# Patient Record
Sex: MALE | Race: BLACK OR AFRICAN AMERICAN | NOT HISPANIC OR LATINO | URBAN - METROPOLITAN AREA
[De-identification: names, ages, dates, MRNs, and addresses within clinical notes are randomized per-mention and may not be internally consistent; named-entity substitution may affect disease eponyms.]

---

## 2023-11-29 VITALS
HEIGHT: 70 IN | HEART RATE: 87 BPM | SYSTOLIC BLOOD PRESSURE: 129 MMHG | OXYGEN SATURATION: 98 % | RESPIRATION RATE: 16 BRPM | TEMPERATURE: 97 F | DIASTOLIC BLOOD PRESSURE: 76 MMHG | WEIGHT: 298.51 LBS

## 2023-11-29 NOTE — PRE-OP CHECKLIST - IDENTIFICATION BAND VERIFIED
Per fax received from [email or web address] - Cyclobenzaprine (FLEXERIL) 10 MG tablet is not covered by patient's Insurance Company  ADALBERTO Zamorano - Please choose:  1.  Change medication that is not covered to a different medication and send new prescription to patient's pharmacy?  2.  Patient will need to pay for the non-covered medication out-of-pocket?   3.  Try for Prior Authorization with Insurance Company to get medication covered?     Key# [id number]   done

## 2023-11-30 ENCOUNTER — INPATIENT (INPATIENT)
Facility: HOSPITAL | Age: 38
LOS: 0 days | Discharge: HOME | DRG: 621 | End: 2023-12-01
Attending: SURGERY | Admitting: SURGERY
Payer: COMMERCIAL

## 2023-11-30 LAB
BLD GP AB SCN SERPL QL: NEGATIVE — SIGNIFICANT CHANGE UP
BLD GP AB SCN SERPL QL: NEGATIVE — SIGNIFICANT CHANGE UP
HCT VFR BLD CALC: 43.7 % — SIGNIFICANT CHANGE UP (ref 39–50)
HCT VFR BLD CALC: 43.7 % — SIGNIFICANT CHANGE UP (ref 39–50)
HGB BLD-MCNC: 14 G/DL — SIGNIFICANT CHANGE UP (ref 13–17)
HGB BLD-MCNC: 14 G/DL — SIGNIFICANT CHANGE UP (ref 13–17)
MCHC RBC-ENTMCNC: 27.2 PG — SIGNIFICANT CHANGE UP (ref 27–34)
MCHC RBC-ENTMCNC: 27.2 PG — SIGNIFICANT CHANGE UP (ref 27–34)
MCHC RBC-ENTMCNC: 32 GM/DL — SIGNIFICANT CHANGE UP (ref 32–36)
MCHC RBC-ENTMCNC: 32 GM/DL — SIGNIFICANT CHANGE UP (ref 32–36)
MCV RBC AUTO: 84.9 FL — SIGNIFICANT CHANGE UP (ref 80–100)
MCV RBC AUTO: 84.9 FL — SIGNIFICANT CHANGE UP (ref 80–100)
NRBC # BLD: 0 /100 WBCS — SIGNIFICANT CHANGE UP (ref 0–0)
NRBC # BLD: 0 /100 WBCS — SIGNIFICANT CHANGE UP (ref 0–0)
PLATELET # BLD AUTO: 192 K/UL — SIGNIFICANT CHANGE UP (ref 150–400)
PLATELET # BLD AUTO: 192 K/UL — SIGNIFICANT CHANGE UP (ref 150–400)
RBC # BLD: 5.15 M/UL — SIGNIFICANT CHANGE UP (ref 4.2–5.8)
RBC # BLD: 5.15 M/UL — SIGNIFICANT CHANGE UP (ref 4.2–5.8)
RBC # FLD: 14.3 % — SIGNIFICANT CHANGE UP (ref 10.3–14.5)
RBC # FLD: 14.3 % — SIGNIFICANT CHANGE UP (ref 10.3–14.5)
RH IG SCN BLD-IMP: POSITIVE — SIGNIFICANT CHANGE UP
RH IG SCN BLD-IMP: POSITIVE — SIGNIFICANT CHANGE UP
WBC # BLD: 10.76 K/UL — HIGH (ref 3.8–10.5)
WBC # BLD: 10.76 K/UL — HIGH (ref 3.8–10.5)
WBC # FLD AUTO: 10.76 K/UL — HIGH (ref 3.8–10.5)
WBC # FLD AUTO: 10.76 K/UL — HIGH (ref 3.8–10.5)

## 2023-11-30 PROCEDURE — 88307 TISSUE EXAM BY PATHOLOGIST: CPT | Mod: 26

## 2023-11-30 DEVICE — STAPLER COVIDIEN TRI-STAPLE 60MM BLACK RELOAD: Type: IMPLANTABLE DEVICE | Site: ABDOMINAL | Status: FUNCTIONAL

## 2023-11-30 DEVICE — CLIP APPLIER ETHICON LIGAMAX 5MM: Type: IMPLANTABLE DEVICE | Site: ABDOMINAL | Status: FUNCTIONAL

## 2023-11-30 DEVICE — STAPLE SEAMGUARD 60 UNI STRT ROTIC GRN: Type: IMPLANTABLE DEVICE | Site: ABDOMINAL | Status: FUNCTIONAL

## 2023-11-30 DEVICE — TISSEEL 4ML: Type: IMPLANTABLE DEVICE | Site: ABDOMINAL | Status: FUNCTIONAL

## 2023-11-30 DEVICE — SURGICEL 4 X 8": Type: IMPLANTABLE DEVICE | Site: ABDOMINAL | Status: FUNCTIONAL

## 2023-11-30 RX ORDER — LABETALOL HCL 100 MG
10 TABLET ORAL EVERY 6 HOURS
Refills: 0 | Status: DISCONTINUED | OUTPATIENT
Start: 2023-11-30 | End: 2023-12-01

## 2023-11-30 RX ORDER — HYDROMORPHONE HYDROCHLORIDE 2 MG/ML
0.5 INJECTION INTRAMUSCULAR; INTRAVENOUS; SUBCUTANEOUS ONCE
Refills: 0 | Status: DISCONTINUED | OUTPATIENT
Start: 2023-11-30 | End: 2023-11-30

## 2023-11-30 RX ORDER — THIAMINE MONONITRATE (VIT B1) 100 MG
500 TABLET ORAL DAILY
Refills: 0 | Status: DISCONTINUED | OUTPATIENT
Start: 2023-11-30 | End: 2023-12-01

## 2023-11-30 RX ORDER — ONDANSETRON 8 MG/1
4 TABLET, FILM COATED ORAL EVERY 6 HOURS
Refills: 0 | Status: DISCONTINUED | OUTPATIENT
Start: 2023-11-30 | End: 2023-12-01

## 2023-11-30 RX ORDER — ENOXAPARIN SODIUM 100 MG/ML
40 INJECTION SUBCUTANEOUS ONCE
Refills: 0 | Status: COMPLETED | OUTPATIENT
Start: 2023-11-30 | End: 2023-11-30

## 2023-11-30 RX ORDER — HYOSCYAMINE SULFATE 0.13 MG
0.12 TABLET ORAL EVERY 6 HOURS
Refills: 0 | Status: DISCONTINUED | OUTPATIENT
Start: 2023-11-30 | End: 2023-12-01

## 2023-11-30 RX ORDER — ACETAMINOPHEN 500 MG
1000 TABLET ORAL EVERY 6 HOURS
Refills: 0 | Status: DISCONTINUED | OUTPATIENT
Start: 2023-11-30 | End: 2023-12-01

## 2023-11-30 RX ORDER — ACETAMINOPHEN 500 MG
1000 TABLET ORAL ONCE
Refills: 0 | Status: COMPLETED | OUTPATIENT
Start: 2023-11-30 | End: 2023-11-30

## 2023-11-30 RX ORDER — INFLUENZA VIRUS VACCINE 15; 15; 15; 15 UG/.5ML; UG/.5ML; UG/.5ML; UG/.5ML
0.5 SUSPENSION INTRAMUSCULAR ONCE
Refills: 0 | Status: DISCONTINUED | OUTPATIENT
Start: 2023-11-30 | End: 2023-12-01

## 2023-11-30 RX ORDER — HYDROMORPHONE HYDROCHLORIDE 2 MG/ML
0.2 INJECTION INTRAMUSCULAR; INTRAVENOUS; SUBCUTANEOUS
Refills: 0 | Status: DISCONTINUED | OUTPATIENT
Start: 2023-11-30 | End: 2023-11-30

## 2023-11-30 RX ORDER — PANTOPRAZOLE SODIUM 20 MG/1
40 TABLET, DELAYED RELEASE ORAL DAILY
Refills: 0 | Status: DISCONTINUED | OUTPATIENT
Start: 2023-11-30 | End: 2023-12-01

## 2023-11-30 RX ORDER — SCOPALAMINE 1 MG/3D
1 PATCH, EXTENDED RELEASE TRANSDERMAL ONCE
Refills: 0 | Status: COMPLETED | OUTPATIENT
Start: 2023-11-30 | End: 2023-11-30

## 2023-11-30 RX ORDER — SODIUM CHLORIDE 9 MG/ML
1000 INJECTION, SOLUTION INTRAVENOUS
Refills: 0 | Status: DISCONTINUED | OUTPATIENT
Start: 2023-11-30 | End: 2023-12-01

## 2023-11-30 RX ADMIN — HYDROMORPHONE HYDROCHLORIDE 0.5 MILLIGRAM(S): 2 INJECTION INTRAMUSCULAR; INTRAVENOUS; SUBCUTANEOUS at 22:17

## 2023-11-30 RX ADMIN — SCOPALAMINE 1 PATCH: 1 PATCH, EXTENDED RELEASE TRANSDERMAL at 12:35

## 2023-11-30 RX ADMIN — Medication 400 MILLIGRAM(S): at 18:19

## 2023-11-30 RX ADMIN — Medication 0.12 MILLIGRAM(S): at 18:19

## 2023-11-30 RX ADMIN — HYDROMORPHONE HYDROCHLORIDE 0.5 MILLIGRAM(S): 2 INJECTION INTRAMUSCULAR; INTRAVENOUS; SUBCUTANEOUS at 23:17

## 2023-11-30 RX ADMIN — SCOPALAMINE 1 PATCH: 1 PATCH, EXTENDED RELEASE TRANSDERMAL at 18:31

## 2023-11-30 RX ADMIN — PANTOPRAZOLE SODIUM 40 MILLIGRAM(S): 20 TABLET, DELAYED RELEASE ORAL at 18:15

## 2023-11-30 RX ADMIN — ONDANSETRON 4 MILLIGRAM(S): 8 TABLET, FILM COATED ORAL at 21:37

## 2023-11-30 RX ADMIN — ENOXAPARIN SODIUM 40 MILLIGRAM(S): 100 INJECTION SUBCUTANEOUS at 12:36

## 2023-11-30 RX ADMIN — SODIUM CHLORIDE 150 MILLILITER(S): 9 INJECTION, SOLUTION INTRAVENOUS at 18:49

## 2023-11-30 RX ADMIN — Medication 10 MILLIGRAM(S): at 18:46

## 2023-11-30 RX ADMIN — Medication 1000 MILLIGRAM(S): at 12:35

## 2023-11-30 RX ADMIN — HYDROMORPHONE HYDROCHLORIDE 0.2 MILLIGRAM(S): 2 INJECTION INTRAMUSCULAR; INTRAVENOUS; SUBCUTANEOUS at 18:15

## 2023-11-30 NOTE — CONSULT NOTE ADULT - ASSESSMENT
38y old Male with PMH of Obesity and Hypertension who presented for a Laparoscopic sleeve gastrectomy and Laparoscopic repair of sliding hiatal hernia. For his hypertension, pre-operatively, his blood pressure was 150/80 and is currently 141/77. Patient states he is compliant with his Norvasc 5mg and his SBP usually ranges between 130-140's. Given that he has no other comorbidities, we would recommend keeping his SBP < 160, and should only give him extra anti-hypertensive medications if he is persistently hypertensive with SBP >160. Please continue with his home dose of Norvasc 5mg. For his throat pain, it is likely secondary to being intubated, but if he continues to have that sensation, we can attempt giving him lozenges or Ibuprofen. Surgical care as per primary team.    Thank you for this consultation and we will continue to monitor.

## 2023-11-30 NOTE — BRIEF OPERATIVE NOTE - NSICDXBRIEFPOSTOP_GEN_ALL_CORE_FT
POST-OP DIAGNOSIS:  Morbid obesity 30-Nov-2023 16:57:21  Mauricio Cormier  Hiatal hernia 30-Nov-2023 16:57:50  Mauricio Cormier

## 2023-11-30 NOTE — BRIEF OPERATIVE NOTE - NSICDXBRIEFPROCEDURE_GEN_ALL_CORE_FT
PROCEDURES:  Laparoscopic sleeve gastrectomy 30-Nov-2023 16:56:45  Mauricio Cormier  Laparoscopic repair of sliding hiatal hernia 30-Nov-2023 16:56:58  Mauricio Cormier

## 2023-11-30 NOTE — CONSULT NOTE ADULT - SUBJECTIVE AND OBJECTIVE BOX
Patient is a 38y old Male with PMH of Obesity and Hypertension who presented for a Laparoscopic sleeve gastrectomy and Laparoscopic repair of sliding hiatal hernia. Post-operatively, he complains of moderate diffuse abdominal pain, as expected. He does also feel like something is stuck in his throat after being intubated. When he tried to clear his throat, he had three episodes of NBNB vomiting. He denies any nausea.     Pre-operatively, his blood pressure was 150/80 and is currently 141/77. Patient states he is compliant with his Norvasc 5mg and his SBP usually ranges between 130-140's. He denies any headache, lightheadedness, chest pain or tightness. ROS as below.    PAST MEDICAL/SURGICAL HISTORY  PAST MEDICAL & SURGICAL HISTORY:  Obese  HTN (hypertension)    No significant past surgical history      MEDICATIONS  (STANDING):  hyoscyamine SL 0.125 milliGRAM(s) SubLingual every 6 hours  influenza   Vaccine 0.5 milliLiter(s) IntraMuscular once  lactated ringers. 1000 milliLiter(s) (150 mL/Hr) IV Continuous <Continuous>  pantoprazole  Injectable 40 milliGRAM(s) IV Push daily  thiamine IVPB 500 milliGRAM(s) IV Intermittent daily    MEDICATIONS  (PRN):  labetalol Injectable 10 milliGRAM(s) IV Push every 6 hours PRN Systolic blood pressure > 155  ondansetron Injectable 4 milliGRAM(s) IV Push every 6 hours PRN Nausea      REVIEW OF SYSTEMS:  CONSTITUTIONAL: No fever, weight loss, or fatigue  ENMT:  +throat pain  RESPIRATORY: No cough, wheezing, chills or shortness of breath  CARDIOVASCULAR: No chest pain, palpitations, dizziness, or leg swelling  GASTROINTESTINAL: moderate abdominal and epigastric pain. No nausea but three episodes of vomiting; No diarrhea or constipation. No melena or hematochezia.  NEUROLOGICAL: No headaches  SKIN: No itching, burning, rashes   LYMPH NODES: No enlarged glands  MUSCULOSKELETAL: Mild lower back discomfort      T(C): 36.9 (11-30-23 @ 19:57), Max: 36.9 (11-30-23 @ 19:57)  HR: 94 (11-30-23 @ 19:57) (90 - 102)  BP: 141/77 (11-30-23 @ 19:57) (129/70 - 166/95)  RR: 18 (11-30-23 @ 19:57) (13 - 20)  SpO2: 96% (11-30-23 @ 19:57) (94% - 97%)  Wt(kg): --Vital Signs Last 24 Hrs  T(C): 36.9 (30 Nov 2023 19:57), Max: 36.9 (30 Nov 2023 19:57)  T(F): 98.4 (30 Nov 2023 19:57), Max: 98.4 (30 Nov 2023 19:57)  HR: 94 (30 Nov 2023 19:57) (90 - 102)  BP: 141/77 (30 Nov 2023 19:57) (129/70 - 166/95)  BP(mean): 95 (30 Nov 2023 19:10) (89 - 118)  RR: 18 (30 Nov 2023 19:57) (13 - 20)  SpO2: 96% (30 Nov 2023 19:57) (94% - 97%)    Parameters below as of 30 Nov 2023 19:57  Patient On (Oxygen Delivery Method): room air      Oxygen Saturation Index= Unable to calculate   [Based on FiO2 = Unknown, SpO2 = 96(11/30/2023 19:57), MAP = Unknown]  Daily     Daily     PHYSICAL EXAM:  GENERAL: NAD  ENMT: Moist mucous membranes  NECK: Supple, No JVD, Normal thyroid  CHEST/LUNG: Clear to auscultation bilaterally; No rales, rhonchi, wheezing  HEART: Regular rate and rhythm; No murmurs, rubs, or gallops  ABDOMEN: Soft, moderate diffuse tenderness, Nondistended; Bowel sounds present; incisions CDI  EXTREMITIES: No edema  LYMPH: No lymphadenopathy noted  SKIN: No rashes or lesions    CAPILLARY BLOOD GLUCOSE

## 2023-12-01 VITALS — TEMPERATURE: 100 F

## 2023-12-01 LAB
ANION GAP SERPL CALC-SCNC: 10 MMOL/L — SIGNIFICANT CHANGE UP (ref 5–17)
ANION GAP SERPL CALC-SCNC: 10 MMOL/L — SIGNIFICANT CHANGE UP (ref 5–17)
BUN SERPL-MCNC: 10 MG/DL — SIGNIFICANT CHANGE UP (ref 7–23)
BUN SERPL-MCNC: 10 MG/DL — SIGNIFICANT CHANGE UP (ref 7–23)
CALCIUM SERPL-MCNC: 9.9 MG/DL — SIGNIFICANT CHANGE UP (ref 8.4–10.5)
CALCIUM SERPL-MCNC: 9.9 MG/DL — SIGNIFICANT CHANGE UP (ref 8.4–10.5)
CHLORIDE SERPL-SCNC: 101 MMOL/L — SIGNIFICANT CHANGE UP (ref 96–108)
CHLORIDE SERPL-SCNC: 101 MMOL/L — SIGNIFICANT CHANGE UP (ref 96–108)
CO2 SERPL-SCNC: 27 MMOL/L — SIGNIFICANT CHANGE UP (ref 22–31)
CO2 SERPL-SCNC: 27 MMOL/L — SIGNIFICANT CHANGE UP (ref 22–31)
CREAT SERPL-MCNC: 1.18 MG/DL — SIGNIFICANT CHANGE UP (ref 0.5–1.3)
CREAT SERPL-MCNC: 1.18 MG/DL — SIGNIFICANT CHANGE UP (ref 0.5–1.3)
EGFR: 81 ML/MIN/1.73M2 — SIGNIFICANT CHANGE UP
EGFR: 81 ML/MIN/1.73M2 — SIGNIFICANT CHANGE UP
GLUCOSE SERPL-MCNC: 114 MG/DL — HIGH (ref 70–99)
GLUCOSE SERPL-MCNC: 114 MG/DL — HIGH (ref 70–99)
HCT VFR BLD CALC: 44.4 % — SIGNIFICANT CHANGE UP (ref 39–50)
HCT VFR BLD CALC: 44.4 % — SIGNIFICANT CHANGE UP (ref 39–50)
HCT VFR BLD CALC: 45.8 % — SIGNIFICANT CHANGE UP (ref 39–50)
HCT VFR BLD CALC: 45.8 % — SIGNIFICANT CHANGE UP (ref 39–50)
HGB BLD-MCNC: 14.3 G/DL — SIGNIFICANT CHANGE UP (ref 13–17)
HGB BLD-MCNC: 14.3 G/DL — SIGNIFICANT CHANGE UP (ref 13–17)
HGB BLD-MCNC: 14.4 G/DL — SIGNIFICANT CHANGE UP (ref 13–17)
HGB BLD-MCNC: 14.4 G/DL — SIGNIFICANT CHANGE UP (ref 13–17)
MAGNESIUM SERPL-MCNC: 2.1 MG/DL — SIGNIFICANT CHANGE UP (ref 1.6–2.6)
MAGNESIUM SERPL-MCNC: 2.1 MG/DL — SIGNIFICANT CHANGE UP (ref 1.6–2.6)
MCHC RBC-ENTMCNC: 27 PG — SIGNIFICANT CHANGE UP (ref 27–34)
MCHC RBC-ENTMCNC: 27 PG — SIGNIFICANT CHANGE UP (ref 27–34)
MCHC RBC-ENTMCNC: 27.2 PG — SIGNIFICANT CHANGE UP (ref 27–34)
MCHC RBC-ENTMCNC: 27.2 PG — SIGNIFICANT CHANGE UP (ref 27–34)
MCHC RBC-ENTMCNC: 31.4 GM/DL — LOW (ref 32–36)
MCHC RBC-ENTMCNC: 31.4 GM/DL — LOW (ref 32–36)
MCHC RBC-ENTMCNC: 32.2 GM/DL — SIGNIFICANT CHANGE UP (ref 32–36)
MCHC RBC-ENTMCNC: 32.2 GM/DL — SIGNIFICANT CHANGE UP (ref 32–36)
MCV RBC AUTO: 84.6 FL — SIGNIFICANT CHANGE UP (ref 80–100)
MCV RBC AUTO: 84.6 FL — SIGNIFICANT CHANGE UP (ref 80–100)
MCV RBC AUTO: 85.9 FL — SIGNIFICANT CHANGE UP (ref 80–100)
MCV RBC AUTO: 85.9 FL — SIGNIFICANT CHANGE UP (ref 80–100)
NRBC # BLD: 0 /100 WBCS — SIGNIFICANT CHANGE UP (ref 0–0)
PHOSPHATE SERPL-MCNC: 2.9 MG/DL — SIGNIFICANT CHANGE UP (ref 2.5–4.5)
PHOSPHATE SERPL-MCNC: 2.9 MG/DL — SIGNIFICANT CHANGE UP (ref 2.5–4.5)
PLATELET # BLD AUTO: 150 K/UL — SIGNIFICANT CHANGE UP (ref 150–400)
PLATELET # BLD AUTO: 150 K/UL — SIGNIFICANT CHANGE UP (ref 150–400)
PLATELET # BLD AUTO: 195 K/UL — SIGNIFICANT CHANGE UP (ref 150–400)
PLATELET # BLD AUTO: 195 K/UL — SIGNIFICANT CHANGE UP (ref 150–400)
POTASSIUM SERPL-MCNC: 4.9 MMOL/L — SIGNIFICANT CHANGE UP (ref 3.5–5.3)
POTASSIUM SERPL-MCNC: 4.9 MMOL/L — SIGNIFICANT CHANGE UP (ref 3.5–5.3)
POTASSIUM SERPL-SCNC: 4.9 MMOL/L — SIGNIFICANT CHANGE UP (ref 3.5–5.3)
POTASSIUM SERPL-SCNC: 4.9 MMOL/L — SIGNIFICANT CHANGE UP (ref 3.5–5.3)
RBC # BLD: 5.25 M/UL — SIGNIFICANT CHANGE UP (ref 4.2–5.8)
RBC # BLD: 5.25 M/UL — SIGNIFICANT CHANGE UP (ref 4.2–5.8)
RBC # BLD: 5.33 M/UL — SIGNIFICANT CHANGE UP (ref 4.2–5.8)
RBC # BLD: 5.33 M/UL — SIGNIFICANT CHANGE UP (ref 4.2–5.8)
RBC # FLD: 14.6 % — HIGH (ref 10.3–14.5)
RBC # FLD: 14.6 % — HIGH (ref 10.3–14.5)
RBC # FLD: 14.8 % — HIGH (ref 10.3–14.5)
RBC # FLD: 14.8 % — HIGH (ref 10.3–14.5)
SODIUM SERPL-SCNC: 138 MMOL/L — SIGNIFICANT CHANGE UP (ref 135–145)
SODIUM SERPL-SCNC: 138 MMOL/L — SIGNIFICANT CHANGE UP (ref 135–145)
WBC # BLD: 11.21 K/UL — HIGH (ref 3.8–10.5)
WBC # BLD: 11.21 K/UL — HIGH (ref 3.8–10.5)
WBC # BLD: 13.57 K/UL — HIGH (ref 3.8–10.5)
WBC # BLD: 13.57 K/UL — HIGH (ref 3.8–10.5)
WBC # FLD AUTO: 11.21 K/UL — HIGH (ref 3.8–10.5)
WBC # FLD AUTO: 11.21 K/UL — HIGH (ref 3.8–10.5)
WBC # FLD AUTO: 13.57 K/UL — HIGH (ref 3.8–10.5)
WBC # FLD AUTO: 13.57 K/UL — HIGH (ref 3.8–10.5)

## 2023-12-01 PROCEDURE — C1889: CPT

## 2023-12-01 PROCEDURE — 85027 COMPLETE CBC AUTOMATED: CPT

## 2023-12-01 PROCEDURE — 83735 ASSAY OF MAGNESIUM: CPT

## 2023-12-01 PROCEDURE — 80048 BASIC METABOLIC PNL TOTAL CA: CPT

## 2023-12-01 PROCEDURE — 86900 BLOOD TYPING SEROLOGIC ABO: CPT

## 2023-12-01 PROCEDURE — 86901 BLOOD TYPING SEROLOGIC RH(D): CPT

## 2023-12-01 PROCEDURE — 36415 COLL VENOUS BLD VENIPUNCTURE: CPT

## 2023-12-01 PROCEDURE — C1781: CPT

## 2023-12-01 PROCEDURE — 84100 ASSAY OF PHOSPHORUS: CPT

## 2023-12-01 PROCEDURE — 88307 TISSUE EXAM BY PATHOLOGIST: CPT

## 2023-12-01 PROCEDURE — 86850 RBC ANTIBODY SCREEN: CPT

## 2023-12-01 RX ORDER — ENOXAPARIN SODIUM 100 MG/ML
40 INJECTION SUBCUTANEOUS
Qty: 21 | Refills: 0
Start: 2023-12-01 | End: 2023-12-21

## 2023-12-01 RX ORDER — HYOSCYAMINE SULFATE 0.13 MG
1 TABLET ORAL
Qty: 84 | Refills: 0
Start: 2023-12-01 | End: 2023-12-21

## 2023-12-01 RX ORDER — AMLODIPINE BESYLATE 2.5 MG/1
1 TABLET ORAL
Qty: 0 | Refills: 0 | DISCHARGE
Start: 2023-12-01

## 2023-12-01 RX ORDER — ONDANSETRON 8 MG/1
5 TABLET, FILM COATED ORAL
Qty: 420 | Refills: 0
Start: 2023-12-01 | End: 2023-12-21

## 2023-12-01 RX ORDER — OMEPRAZOLE 10 MG/1
1 CAPSULE, DELAYED RELEASE ORAL
Qty: 30 | Refills: 0
Start: 2023-12-01 | End: 2023-12-30

## 2023-12-01 RX ORDER — ACETAMINOPHEN 500 MG
20.3 TABLET ORAL
Qty: 1136.8 | Refills: 0
Start: 2023-12-01 | End: 2023-12-14

## 2023-12-01 RX ORDER — ACETAMINOPHEN 500 MG
650 TABLET ORAL EVERY 6 HOURS
Refills: 0 | Status: DISCONTINUED | OUTPATIENT
Start: 2023-12-01 | End: 2023-12-01

## 2023-12-01 RX ORDER — HYDROMORPHONE HYDROCHLORIDE 2 MG/ML
0.5 INJECTION INTRAMUSCULAR; INTRAVENOUS; SUBCUTANEOUS ONCE
Refills: 0 | Status: DISCONTINUED | OUTPATIENT
Start: 2023-12-01 | End: 2023-12-01

## 2023-12-01 RX ORDER — ONDANSETRON 8 MG/1
1 TABLET, FILM COATED ORAL
Qty: 56 | Refills: 0
Start: 2023-12-01 | End: 2023-12-14

## 2023-12-01 RX ORDER — AMLODIPINE BESYLATE 2.5 MG/1
1 TABLET ORAL
Refills: 0 | DISCHARGE

## 2023-12-01 RX ADMIN — Medication 0.12 MILLIGRAM(S): at 05:34

## 2023-12-01 RX ADMIN — Medication 650 MILLIGRAM(S): at 07:48

## 2023-12-01 RX ADMIN — Medication 105 MILLIGRAM(S): at 12:09

## 2023-12-01 RX ADMIN — Medication 0.12 MILLIGRAM(S): at 11:56

## 2023-12-01 RX ADMIN — HYDROMORPHONE HYDROCHLORIDE 0.5 MILLIGRAM(S): 2 INJECTION INTRAMUSCULAR; INTRAVENOUS; SUBCUTANEOUS at 02:52

## 2023-12-01 RX ADMIN — Medication 650 MILLIGRAM(S): at 15:04

## 2023-12-01 RX ADMIN — PANTOPRAZOLE SODIUM 40 MILLIGRAM(S): 20 TABLET, DELAYED RELEASE ORAL at 11:56

## 2023-12-01 RX ADMIN — SODIUM CHLORIDE 150 MILLILITER(S): 9 INJECTION, SOLUTION INTRAVENOUS at 02:52

## 2023-12-01 RX ADMIN — Medication 0.12 MILLIGRAM(S): at 00:21

## 2023-12-01 NOTE — PROGRESS NOTE ADULT - ASSESSMENT
38 M w morbid obesity presenting for elective sleeve gastrectomy    Plan  Thiamine  BCLD  PPI  Nutrition consult  AM labs  OOBC/SCDs  possible discharge if tolerating diet    Plans to be discussed with attending and chief resident for finalization. 
Mr. Glenn Watt is a 38/M with morbid obesity and HTN who presented for a Laparoscopic sleeve gastrectomy and Laparoscopic repair of sliding hiatal hernia.     BP and HR range:  HR: 67 (12-01-23 @ 11:54) (67 - 102)  BP: 135/73 (12-01-23 @ 11:54) (126/78 - 166/95)    Recommendations:    #HTN - can resume home dose of Amlodipine/Norvasc when cleared to resume by surgery; monitor BP; outpatient follow up  #Morbid obesity with hiatal hernia - s/p Laparoscopic sleeve gastrectomy and Laparoscopic repair of sliding hiatal hernia; post op care per surgery, pain control, incentive spirometry, mobilize; post op labs reviewed  #Leucocytosis, likely reactive - afebrile, TMax 98.7F, monitor temp and watch out for symptoms of infection      Thank you for allowing us to participate in the care of your patients. Feel free to reach out for any questions.    Reached out to primary team to discuss recommendations.       For his hypertension, pre-operatively, his blood pressure was 150/80 and is currently 141/77. Patient states he is compliant with his Norvasc 5mg and his SBP usually ranges between 130-140's. Given that he has no other comorbidities, we would recommend keeping his SBP < 160, and should only give him extra anti-hypertensive medications if he is persistently hypertensive with SBP >160. Please continue with his home dose of Norvasc 5mg. For his throat pain, it is likely secondary to being intubated, but if he continues to have that sensation, we can attempt giving him lozenges or Ibuprofen. Surgical care as per primary team.

## 2023-12-01 NOTE — DISCHARGE NOTE NURSING/CASE MANAGEMENT/SOCIAL WORK - PATIENT PORTAL LINK FT
You can access the FollowMyHealth Patient Portal offered by [facility] by registering at the following website: [email or web address]. By joining [employer]’s FollowMyHealth portal, you will also be able to view your health information using other applications (apps) compatible with our system.

## 2023-12-01 NOTE — DISCHARGE NOTE PROVIDER - DETAILS OF MALNUTRITION DIAGNOSIS/DIAGNOSES
This patient has been assessed with a concern for Malnutrition and was treated during this hospitalization for the following Nutrition diagnosis/diagnoses:     -  12/01/2023: Morbid obesity (BMI > 40)

## 2023-12-01 NOTE — DIETITIAN INITIAL EVALUATION ADULT - OTHER INFO
38 M w morbid obesity presenting for elective sleeve gastrectomy    Pt seen on [address] [place] at bedside. On assessment, pt resting in bed. Currently on Bariatric Clear Liquids (BARICLLIQ) diet, tolerating PO. Pt had sips of water out of the clear, 30cc cups. Pain and nausea well controlled. Discussed volumes of various cup sizes on tray table and encouraged aiming for 4 oz/hr as tolerated. Prepared with protein shakes, with plan to get vitamins after discharge. RD provided in-depth education on diet advancement and specific nutrient needs status-post sleeve gastrectomy. No known food allergies. No dietary restrictions at home. Skin: surgical incisions. GI: WDL per flowsheet. Labs reviewed: elevated serum Glucose (114); will monitor trends. Pt's wt on admission was 298.5 pounds, pt's ideal body weight is 166 pounds, pt is 180% of ideal body weight; ideal body weight to be utilized for nutrient calculations. RD observed pt with no overt signs of muscle or fat wasting. Based on ASPEN guidelines, pt does not meet criteria for malnutrition at this time. RD to continue to follow up.

## 2023-12-01 NOTE — DIETITIAN INITIAL EVALUATION ADULT - PERTINENT LABORATORY DATA
12-01    138  |  101  |  10  ----------------------------<  114<H>  4.9   |  27  |  1.18    Ca    9.9      01 Dec 2023 09:31  Phos  2.9     12-01  Mg     2.1     12-01

## 2023-12-01 NOTE — DIETITIAN INITIAL EVALUATION ADULT - PERSON TAUGHT/METHOD
RD Discussed volumes of various cup sizes on tray table and encouraged aiming for 4 oz/hr as tolerated. Pt is prepared with protein shakes, with plan to get vitamins after discharge. RD provided indepth edu on diet advancement and specific nutrient needs status post gastric sleeve. Pt appears receptive, verbalized understanding. Written nutrition education handouts provided./verbal instruction/written material/patient instructed

## 2023-12-01 NOTE — DIETITIAN INITIAL EVALUATION ADULT - ADD RECOMMEND
1. Bariatric Clear Liquids diet  2. Recommend advance to phase 1 bariatric full liquid diet when medically feasible   3. Encourage adequate hydration with goal of 4oz/hr and/or 64 oz/day   4. Monitor BMP, BG, POCT, electrolytes, replete prn

## 2023-12-01 NOTE — DIETITIAN INITIAL EVALUATION ADULT - OTHER CALCULATIONS
Above energy needs calculated for wt maintenance (20-25kcal/kg ideal body weight).  Weeks 1-2 estimated needs: 755-905kcal/day (10-12kcal/kg), 91-113g pro/day (1.2-1.5g/kg), >/=64oz clear fluids.

## 2023-12-01 NOTE — PROGRESS NOTE ADULT - SUBJECTIVE AND OBJECTIVE BOX
Patient is a 38y old  Male who presents with a chief complaint of Laparoscopic sleeve gastrectomy and Laparoscopic repair of sliding hiatal hernia (30 Nov 2023 21:00).    Patient seen and examined today. He has not had a bowel movement. He only has mild post op abdominal pain. He denies vomiting, dyspnea, chest pain, fever, dizziness, bleeding symptoms or any other new complaints.    Allergies    No Known Allergies    Last Bowel Movement: 30-Nov-2023 (12-01-23 @ 08:12)  Last Bowel Movement: 30-Nov-2023 (11-30-23 @ 23:17)    MEDICATIONS  (STANDING):  hyoscyamine SL 0.125 milliGRAM(s) SubLingual every 6 hours  influenza   Vaccine 0.5 milliLiter(s) IntraMuscular once  lactated ringers. 1000 milliLiter(s) (150 mL/Hr) IV Continuous <Continuous>  pantoprazole  Injectable 40 milliGRAM(s) IV Push daily  thiamine IVPB 500 milliGRAM(s) IV Intermittent daily    MEDICATIONS  (PRN):  acetaminophen   Oral Liquid .. 650 milliGRAM(s) Oral every 6 hours PRN Mild Pain (1 - 3), Moderate Pain (4 - 6), Severe Pain (7 - 10)  labetalol Injectable 10 milliGRAM(s) IV Push every 6 hours PRN Systolic blood pressure > 155  ondansetron Injectable 4 milliGRAM(s) IV Push every 6 hours PRN Nausea      Vital Signs Last 24 Hrs  T(C): 37 (12-01-23 @ 11:54), Max: 37.1 (12-01-23 @ 08:12)  T(F): 98.6 (12-01-23 @ 11:54), Max: 98.7 (12-01-23 @ 08:12)  HR: 67 (12-01-23 @ 11:54) (67 - 102)  BP: 135/73 (12-01-23 @ 11:54) (126/78 - 166/95)  BP(mean): 95 (11-30-23 @ 19:10) (89 - 118)  RR: 18 (12-01-23 @ 11:54) (13 - 20)  SpO2: 96% (12-01-23 @ 11:54) (93% - 97%)      I&O's Summary    30 Nov 2023 07:01  -  01 Dec 2023 07:00  --------------------------------------------------------  IN: 3550 mL / OUT: 1925 mL / NET: 1625 mL    01 Dec 2023 07:01  -  01 Dec 2023 13:02  --------------------------------------------------------  IN: 900 mL / OUT: 1850 mL / NET: -950 mL      Oxygen Saturation Index= Unable to calculate   [Based on FiO2 = Unknown, SpO2 = 96(12/01/2023 11:54), MAP = Unknown]    PHYSICAL EXAM:  GENERAL: NAD  HEAD:  Atraumatic, Normocephalic  EYES: EOMI, conjunctiva and sclera clear  ENMT: Moist mucous membranes  NECK: Supple, No JVD  NERVOUS SYSTEM:  Alert & Oriented X3, Good concentration; nonfocal exam  CHEST/LUNG: Clear to auscultation bilaterally  HEART: Regular rate and rhythm; Normal S1 and S2  ABDOMEN: clean and dry incisions, Soft, mild tenderness without guarding, Nondistended; Bowel sounds present  EXTREMITIES:  2+ Peripheral Pulses, No clubbing, cyanosis, or edema      Consultant(s) Notes Reviewed:  [x ] YES  [ ] NO  Care Discussed with Consultants/Other Providers [ x] YES  [ ] NO      Investigations:                        14.3   13.57 )-----------( 195      ( 01 Dec 2023 12:12 )             44.4     12-01    138  |  101  |  10  ----------------------------<  114<H>  4.9   |  27  |  1.18    Ca    9.9      01 Dec 2023 09:31  Phos  2.9     12-01  Mg     2.1     12-01        
General Surgery Post op Check    Pt seen and examined without complaints. Pain is controlled. Denies SOB/CP/N/V.     Vital Signs Last 24 Hrs  T(C): 36.9 (30 Nov 2023 19:57), Max: 36.9 (30 Nov 2023 19:57)  T(F): 98.4 (30 Nov 2023 19:57), Max: 98.4 (30 Nov 2023 19:57)  HR: 94 (30 Nov 2023 19:57) (90 - 102)  BP: 141/77 (30 Nov 2023 19:57) (129/70 - 166/95)  BP(mean): 95 (30 Nov 2023 19:10) (89 - 118)  RR: 18 (30 Nov 2023 19:57) (13 - 20)  SpO2: 96% (30 Nov 2023 19:57) (94% - 97%)    Parameters below as of 30 Nov 2023 19:57  Patient On (Oxygen Delivery Method): room air        I&O's Summary    30 Nov 2023 07:01  -  30 Nov 2023 20:57  --------------------------------------------------------  IN: 1750 mL / OUT: 325 mL / NET: 1425 mL        Physical Exam  Gen: NAD, A&Ox3  Pulm: No respiratory distress, no subcostal retractions  CV: RRR, no JVD  Abd: Soft, appropriate incisional TTP, ND, no rebound or guarding       38 M w morbid obesity presenting for elective sleeve gastrectomy    Plan  Thiamine  BCLD  PPI  Nutrition consult  AM labs  OOBC/SCDs        
Overnight events: No acute overnight events.       POD#1 VSG with HHR    SUBJECTIVE: Patient seen at bedside with chief resident. Patient does endorses a little pain but reports the medication does help to control it. He denies any emesis.   He endorses ambulation.     MEDICATIONS  (STANDING):  hyoscyamine SL 0.125 milliGRAM(s) SubLingual every 6 hours  influenza   Vaccine 0.5 milliLiter(s) IntraMuscular once  lactated ringers. 1000 milliLiter(s) (150 mL/Hr) IV Continuous <Continuous>  pantoprazole  Injectable 40 milliGRAM(s) IV Push daily  thiamine IVPB 500 milliGRAM(s) IV Intermittent daily    MEDICATIONS  (PRN):  acetaminophen     Tablet .. 1000 milliGRAM(s) Oral every 6 hours PRN Mild Pain (1 - 3), Moderate Pain (4 - 6)  labetalol Injectable 10 milliGRAM(s) IV Push every 6 hours PRN Systolic blood pressure > 155  ondansetron Injectable 4 milliGRAM(s) IV Push every 6 hours PRN Nausea      Vital Signs Last 24 Hrs  T(C): 36.7 (01 Dec 2023 00:46), Max: 36.9 (30 Nov 2023 19:57)  T(F): 98 (01 Dec 2023 00:46), Max: 98.4 (30 Nov 2023 19:57)  HR: 93 (01 Dec 2023 02:25) (90 - 102)  BP: 126/78 (01 Dec 2023 02:25) (126/78 - 166/95)  BP(mean): 95 (30 Nov 2023 19:10) (89 - 118)  RR: 18 (01 Dec 2023 02:25) (13 - 20)  SpO2: 93% (01 Dec 2023 02:25) (93% - 97%)    Parameters below as of 01 Dec 2023 02:25  Patient On (Oxygen Delivery Method): room air        Physical Exam:  General: NAD, resting comfortably in bed  Pulmonary: Nonlabored breathing, no respiratory distress  Cardiovascular: NSR  Abdominal: soft, mild distended, Appropriate incisional tenderness Incision clean, dry, and intact.   Extremities: WWP, normal strength  Neuro: A/O x 3, CNs II-XII grossly intact,     I&O's Summary    30 Nov 2023 07:01  -  01 Dec 2023 06:57  --------------------------------------------------------  IN: 3050 mL / OUT: 1025 mL / NET: 2025 mL        LABS:                        14.0   10.76 )-----------( 192 ( 30 Nov 2023 22:17 )             43.7               CAPILLARY BLOOD GLUCOSE            RADIOLOGY & ADDITIONAL STUDIES:

## 2023-12-01 NOTE — DISCHARGE NOTE PROVIDER - NSDCFUADDINST_GEN_ALL_CORE_FT
-Continue bariatric diet as directed by nutritionist     -Activity: no heavy lifting or strenuous exercise for one month.     -You may shower but no soaking baths, no swimming pools.     -Please take Omeprazole 40 mg by mouth once a day.     - Please take Hyoscyamine 0.125 mg by mouth 4 times a day.     - Please take Zofran 4 mg every 6 hours as needed for nausea and or vomiting.     - Please take Tylenol 650 every 6 hours as needed for Moderate to Severe pain. Please do not exceed over 4,000 mg of Tylenol a day.      -Notify physician for fever greater than 101, worsening abdominal pain, bleeding or drainage from incision sites.      -Follow-up with Dr. Castro in 1 week. Call the office to make an appointment.     General Discharge Instructions:  Please resume all regular home medications unless specifically advised not to take a particular medication. Also, please take any new medications as prescribed.  Please get plenty of rest, continue to ambulate several times per day, and drink adequate amounts of fluids. Avoid lifting weights greater than 5-10 lbs until you follow-up with your surgeon, who will instruct you further regarding activity restrictions.  Avoid driving or operating heavy machinery while taking pain medications.  Please follow-up with your surgeon and Primary Care Provider (PCP) as advised.  Incision Care:  *Please call your doctor or nurse practitioner if you have increased pain, swelling, redness, or drainage from the incision site.  *Avoid swimming and baths until your follow-up appointment.  *You may shower, and wash surgical incisions with a mild soap and warm water. Gently pat the area dry..  *You incisions are covered with Derma-Bond, when in the shower, do not scrub or pat dry.     Warning Signs to monitor:  Please call your doctor or nurse practitioner if you experience the following:  *You experience new chest pain, pressure, squeezing or tightness.  *New or worsening cough, shortness of breath, or wheeze.  *If you are vomiting and cannot keep down fluids or your medications.  *You are getting dehydrated due to continued vomiting, diarrhea, or other reasons. Signs of dehydration include dry mouth, rapid heartbeat, or feeling dizzy or faint when standing.  *You see blood or dark/black material when you vomit or have a bowel movement.  *You experience burning when you urinate, have blood in your urine, or experience a discharge.  *Your pain is not improving within 8-12 hours or is not gone within 24 hours. Call or return immediately if your pain is getting worse, changes location, or moves to your chest or back.  *You have shaking chills, or fever greater than 101.5 degrees Fahrenheit or 38 degrees Celsius.  *Any change in your symptoms, or any new symptoms that concern you.    If unable to reach a medical professional please go to an emergency room.

## 2023-12-01 NOTE — DIETITIAN INITIAL EVALUATION ADULT - PERTINENT MEDS FT
MEDICATIONS  (STANDING):  hyoscyamine SL 0.125 milliGRAM(s) SubLingual every 6 hours  influenza   Vaccine 0.5 milliLiter(s) IntraMuscular once  lactated ringers. 1000 milliLiter(s) (150 mL/Hr) IV Continuous <Continuous>  pantoprazole  Injectable 40 milliGRAM(s) IV Push daily  thiamine IVPB 500 milliGRAM(s) IV Intermittent daily    MEDICATIONS  (PRN):  acetaminophen   Oral Liquid .. 650 milliGRAM(s) Oral every 6 hours PRN Mild Pain (1 - 3), Moderate Pain (4 - 6), Severe Pain (7 - 10)  labetalol Injectable 10 milliGRAM(s) IV Push every 6 hours PRN Systolic blood pressure > 155  ondansetron Injectable 4 milliGRAM(s) IV Push every 6 hours PRN Nausea

## 2023-12-01 NOTE — DISCHARGE NOTE PROVIDER - HOSPITAL COURSE
38 M w morbid obesity presenting for elective sleeve gastrectomy and HHR. His postoperative course was unremarkable with advancement of diet, passing trial of void, and pain control. On day of discharge patient was stable to be d/c'd home.

## 2023-12-01 NOTE — DISCHARGE NOTE PROVIDER - NSDCMRMEDTOKEN_GEN_ALL_CORE_FT
acetaminophen 160 mg/5 mL oral liquid: 20.3 milliliter(s) orally every 6 hours as needed for  moderate to severe pain  Levsin SL 0.125 mg sublingual tablet: 1 tab(s) sublingually every 6 hours  Lovenox 40 mg/0.4 mL injectable solution: 40 milligram(s) subcutaneously once a day  omeprazole 40 mg oral delayed release capsule: 1 cap(s) orally once a day  ondansetron 4 mg/5 mL oral solution: 5 milliliter(s) orally every 6 hours as needed for  nausea or emesis   acetaminophen 160 mg/5 mL oral liquid: 20.3 milliliter(s) orally every 6 hours as needed for  moderate to severe pain  Levsin SL 0.125 mg sublingual tablet: 1 tab(s) sublingually every 6 hours  Lovenox 40 mg/0.4 mL injectable solution: 40 milligram(s) subcutaneously once a day  Norvasc 5 mg oral tablet: 1 orally once a day  omeprazole 40 mg oral delayed release capsule: 1 cap(s) orally once a day  ondansetron 4 mg/5 mL oral solution: 5 milliliter(s) orally every 6 hours as needed for  nausea or emesis   acetaminophen 160 mg/5 mL oral liquid: 20.3 milliliter(s) orally every 6 hours as needed for  moderate to severe pain  Levsin SL 0.125 mg sublingual tablet: 1 tab(s) sublingually every 6 hours  Lovenox 40 mg/0.4 mL injectable solution: 40 milligram(s) subcutaneously once a day  Norvasc 5 mg oral tablet: 1 orally once a day  omeprazole 40 mg oral delayed release capsule: 1 cap(s) orally once a day  ondansetron 4 mg oral tablet, disintegratin tab(s) orally every 6 hours as needed for nausea and emesis

## 2023-12-07 DIAGNOSIS — K44.9 DIAPHRAGMATIC HERNIA WITHOUT OBSTRUCTION OR GANGRENE: ICD-10-CM

## 2023-12-07 DIAGNOSIS — E66.01 MORBID (SEVERE) OBESITY DUE TO EXCESS CALORIES: ICD-10-CM

## 2023-12-07 DIAGNOSIS — I10 ESSENTIAL (PRIMARY) HYPERTENSION: ICD-10-CM

## 2023-12-07 DIAGNOSIS — D72.829 ELEVATED WHITE BLOOD CELL COUNT, UNSPECIFIED: ICD-10-CM

## 2023-12-07 DIAGNOSIS — Z71.3 DIETARY COUNSELING AND SURVEILLANCE: ICD-10-CM

## 2023-12-12 LAB
SURGICAL PATHOLOGY STUDY: SIGNIFICANT CHANGE UP
SURGICAL PATHOLOGY STUDY: SIGNIFICANT CHANGE UP
